# Patient Record
Sex: FEMALE | URBAN - METROPOLITAN AREA
[De-identification: names, ages, dates, MRNs, and addresses within clinical notes are randomized per-mention and may not be internally consistent; named-entity substitution may affect disease eponyms.]

---

## 2022-06-22 ENCOUNTER — TELEPHONE (OUTPATIENT)
Dept: OBGYN CLINIC | Facility: CLINIC | Age: 22
End: 2022-06-22

## 2022-06-22 NOTE — TELEPHONE ENCOUNTER
Fax rec'd from [place] requesting refill on Metoclopramide 10 mg tabs. Pt is a prev pt of RD while at [employer]. Per care everywhere pt see's providers at [facility]. Telephone to pt but no answer and unable to leave vm. This MA will [facility].